# Patient Record
Sex: FEMALE | Race: WHITE | Employment: OTHER | URBAN - METROPOLITAN AREA
[De-identification: names, ages, dates, MRNs, and addresses within clinical notes are randomized per-mention and may not be internally consistent; named-entity substitution may affect disease eponyms.]

---

## 2018-09-12 ENCOUNTER — OFFICE VISIT (OUTPATIENT)
Dept: INFECTIOUS DISEASES | Age: 68
End: 2018-09-12
Payer: COMMERCIAL

## 2018-09-12 VITALS
WEIGHT: 175 LBS | BODY MASS INDEX: 31.01 KG/M2 | SYSTOLIC BLOOD PRESSURE: 129 MMHG | HEART RATE: 100 BPM | TEMPERATURE: 100.4 F | HEIGHT: 63 IN | DIASTOLIC BLOOD PRESSURE: 76 MMHG

## 2018-09-12 DIAGNOSIS — M46.24 CHRONIC OSTEOMYELITIS OF THORACIC SPINE (HCC): Primary | ICD-10-CM

## 2018-09-12 PROCEDURE — 99214 OFFICE O/P EST MOD 30 MIN: CPT | Performed by: INTERNAL MEDICINE

## 2018-09-12 RX ORDER — DOXYCYCLINE HYCLATE 100 MG/1
100 CAPSULE ORAL 2 TIMES DAILY
Qty: 60 CAPSULE | Refills: 5 | Status: SHIPPED | OUTPATIENT
Start: 2018-09-12 | End: 2019-05-10 | Stop reason: SDUPTHER

## 2018-09-12 RX ORDER — IBUPROFEN 200 MG
200 TABLET ORAL EVERY 6 HOURS PRN
COMMUNITY

## 2018-09-12 RX ORDER — DOXYCYCLINE HYCLATE 100 MG/1
100 CAPSULE ORAL 2 TIMES DAILY
COMMUNITY
End: 2018-09-12 | Stop reason: SDUPTHER

## 2018-09-12 ASSESSMENT — ENCOUNTER SYMPTOMS
GASTROINTESTINAL NEGATIVE: 1
RESPIRATORY NEGATIVE: 1

## 2018-09-12 NOTE — PROGRESS NOTES
inflammatory markers after x-rays were negative. The patient discontinued anti-inflammatories/pain medications and the pain did improve with time. The patient today does not report any concerns, no fevers or chills, no cough or shortness of breath, she does report that she has gained some weight as she is not as active as she was previously. 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 I have personally reviewed the past medical history, medications, social history, and I have updated the database accordingly. Past Medical History:     Past Medical History:   Diagnosis Date    Bronchitis     Chronic Osteomyelitis of thoracic spine     Diabetes mellitus (HCC)     type 2    Diskitis     Eczema     Epidural abscess     GERD (gastroesophageal reflux disease)     Hypertension     Postmenopausal     Thoracic spine osteomyelitis     Wheezing      Medications:     Current Outpatient Prescriptions   Medication Sig Dispense Refill    Multiple Vitamins-Minerals (MULTIVITAMIN ADULT PO) Take by mouth daily      ibuprofen (ADVIL) 200 MG tablet Take 200 mg by mouth every 6 hours as needed for Pain      doxycycline hyclate (VIBRAMYCIN) 100 MG capsule Take 1 capsule by mouth 2 times daily 60 capsule 5     No current facility-administered medications for this visit. Allergies:   Latex and Cephalosporins     Review of Systems:   Review of Systems   Constitutional: Positive for unexpected weight change (weight gain - less exercise). Negative for chills and fever. Respiratory: Negative. Cardiovascular: Negative. Gastrointestinal: Negative. Genitourinary: Negative. Musculoskeletal: Negative. Neurological: Negative.          Physical Examination :   /76 (Site: Left Upper Arm)   Pulse 100   Temp 100.4 °F (38 °C)   Ht 5' 3\" (1.6 m)   Wt 175 lb (79.4 kg)   BMI 31.00 kg/m²     Physical Exam lymphocytes 34.6 % [facility]   % monocytes 7.4 % [facility]   % eosinophils 5.9 % [facility]   % basophils 0.9 % [facility]   Neutrophils Absolute 5.1 1.5 - 6.6 [address]   Lymphocytes Absolute 3.4 1.0 - 3.5 X10E9/L [facility]   Monocytes Absolute 0.7 0 - 0.9 X10E9/L [facility]   Eosinophils Absolute 0.6 (H) 0.0 - 0.4 [id number] [address] [facility]   Basophils Absolute 0.1 0 - 0.9 [id number] [address] [facility]     C-reactive protein (07/05/2018 2:30 PM EDT)  C-reactive protein (07/05/2018 2:30 PM EDT)   Component Value Ref Range Performed At   CRP 0.2 0.000 - 0.744 mg/dL [facility]       Basic metabolic panel (61/90/8439 2:30 PM EDT)  Basic metabolic panel (78/49/2289 2:30 PM EDT)   Component Value Ref Range Performed At   Sodium 143 134 - 146 mmol/L [address]   Potassium, Bld 3.8 3.5 - 5.0 mmol/L [facility]   Chloride 107 98 - 109 mmol/L [facility]   CO2 26 22 - 32 mmol/L [facility]   Anion gap 10  Comment:   ANION GAP CALCULATION DOES NOT  INCLUDE K, NORMAL RANGES  REFLECT THIS CHANGE   4 - 12 mmol/L [address]   BUN, Bld 25 5 - 27 mg/dL [facility]   Creatinine 1.15 (H)Comment: METHOD TRACEABLE TO IDMS STANDARD 0.40 - 1.00 mg/dL [facility]   Glucose 100 (H) 65 - 99 mg/dL [facility]   Calcium 10.1 8.5 - 10.5 mg/dL [facility]   GFR MDRD Non Af Amer 47 (L) >59 ml/min/1.73sq.m [address]   GFR MDRD Af Amer 57 (L) >59 ml/min/1.73sq.m [facility]       Imaging Studies:   X-ray spine thoracic 3 views (07/05/2018 2:59

## 2019-05-10 RX ORDER — DOXYCYCLINE HYCLATE 100 MG/1
CAPSULE ORAL
Qty: 60 CAPSULE | Refills: 5 | Status: SHIPPED | OUTPATIENT
Start: 2019-05-10 | End: 2019-09-12 | Stop reason: SDUPTHER

## 2019-09-06 PROBLEM — L53.8 MACULAR ERYTHEMATOUS RASH: Status: ACTIVE | Noted: 2018-09-05

## 2019-09-06 PROBLEM — L08.9 SKIN INFECTION: Status: ACTIVE | Noted: 2018-09-05

## 2019-09-06 PROBLEM — M86.9 PYOGENIC INFLAMMATION OF BONE (HCC): Status: ACTIVE | Noted: 2018-07-06

## 2019-09-06 PROBLEM — I10 HYPERTENSION: Status: ACTIVE | Noted: 2017-01-09

## 2019-09-12 ENCOUNTER — OFFICE VISIT (OUTPATIENT)
Dept: INFECTIOUS DISEASES | Age: 69
End: 2019-09-12
Payer: COMMERCIAL

## 2019-09-12 VITALS
RESPIRATION RATE: 16 BRPM | DIASTOLIC BLOOD PRESSURE: 87 MMHG | WEIGHT: 177.8 LBS | BODY MASS INDEX: 31.5 KG/M2 | HEART RATE: 97 BPM | OXYGEN SATURATION: 97 % | TEMPERATURE: 97.3 F | SYSTOLIC BLOOD PRESSURE: 129 MMHG | HEIGHT: 63 IN

## 2019-09-12 DIAGNOSIS — M46.24 CHRONIC OSTEOMYELITIS OF THORACIC SPINE (HCC): Primary | ICD-10-CM

## 2019-09-12 PROCEDURE — 99213 OFFICE O/P EST LOW 20 MIN: CPT | Performed by: INTERNAL MEDICINE

## 2019-09-12 RX ORDER — DOXYCYCLINE HYCLATE 100 MG/1
CAPSULE ORAL
Qty: 60 CAPSULE | Refills: 11 | Status: SHIPPED | OUTPATIENT
Start: 2019-09-12 | End: 2020-09-15 | Stop reason: SDUPTHER

## 2019-09-12 ASSESSMENT — ENCOUNTER SYMPTOMS
RHINORRHEA: 1
RESPIRATORY NEGATIVE: 1
GASTROINTESTINAL NEGATIVE: 1

## 2019-09-27 DIAGNOSIS — M46.24 CHRONIC OSTEOMYELITIS OF THORACIC SPINE (HCC): ICD-10-CM

## 2020-09-15 ENCOUNTER — OFFICE VISIT (OUTPATIENT)
Dept: INFECTIOUS DISEASES | Age: 70
End: 2020-09-15
Payer: COMMERCIAL

## 2020-09-15 VITALS
SYSTOLIC BLOOD PRESSURE: 137 MMHG | TEMPERATURE: 97.4 F | OXYGEN SATURATION: 99 % | DIASTOLIC BLOOD PRESSURE: 84 MMHG | HEART RATE: 77 BPM | HEIGHT: 63 IN | WEIGHT: 196 LBS | BODY MASS INDEX: 34.73 KG/M2

## 2020-09-15 PROCEDURE — 99213 OFFICE O/P EST LOW 20 MIN: CPT | Performed by: INTERNAL MEDICINE

## 2020-09-15 RX ORDER — ALBUTEROL SULFATE 90 UG/1
2 AEROSOL, METERED RESPIRATORY (INHALATION) EVERY 6 HOURS PRN
COMMUNITY

## 2020-09-15 RX ORDER — CETIRIZINE HYDROCHLORIDE 10 MG/1
1 TABLET ORAL 2 TIMES DAILY
COMMUNITY
Start: 2020-09-05

## 2020-09-15 RX ORDER — TRIAMCINOLONE ACETONIDE 5 MG/G
CREAM TOPICAL 2 TIMES DAILY
COMMUNITY
Start: 2019-08-06

## 2020-09-15 RX ORDER — DOXYCYCLINE HYCLATE 100 MG/1
CAPSULE ORAL
Qty: 60 CAPSULE | Refills: 11 | Status: SHIPPED | OUTPATIENT
Start: 2020-09-15 | End: 2021-09-13

## 2020-09-15 ASSESSMENT — ENCOUNTER SYMPTOMS
RESPIRATORY NEGATIVE: 1
GASTROINTESTINAL NEGATIVE: 1
BACK PAIN: 1

## 2020-09-15 NOTE — PROGRESS NOTES
[facility]  Office Progress Note  Today's Date and Time: 9/15/2020, 2:30 PM    Impression:     1. Chronic osteomyelitis of thoracic spine (HCC)    2. Eczema, unspecified type    3. Rash and nonspecific skin eruption         Recommendations   · She will continue on oral doxycycline for the chronic osteomyelitis secondary to MSSA. · The patient's eczema is not well controlled and may need to consider stronger immunosuppressive therapy. · She does have a drug reaction on the anterior abdominal wall that is not at all the same as the diffuse/generalized eczema rash that she has. · The patient is to address this with the immunologist and may need to be considered for skin biopsies if the rash continues. · She will otherwise continue to follow-up with me on a yearly basis    I have ordered the following medications/ labs:  No orders of the defined types were placed in this encounter. Orders Placed This Encounter   Medications    doxycycline hyclate (VIBRAMYCIN) 100 MG capsule     Sig: TAKE 1 CAPSULE BY MOUTH TWICE DAILY     Dispense:  60 capsule     Refill:  11       Chief complaint/reason for consultation:     Chief Complaint   Patient presents with    Follow-up     1 year f/u osteomyelitis        History of Present Illness:   Roderick Yancey is a 78y.o.-year-old female who I am seeing in follow-up for chronic discitis/osteomyelitis at T9-T10. 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 She has been on oral suppressive therapy with doxycycline since that time and has actually done very well. Giancarlo Summers comes in today and she tells me that she has mostly been at home since this pandemic started and she has gained a lot of weight during that time as she is less active. She continues on oral doxycycline therapy for the chronic suppression for the osteomyelitis.   She has had some issues with allergies/pruritus. She does have known eczema but has recently developed a rash over her abdomen. She does have some chronic back pain but overall it is well controlled. She was treated for cellulitis of the left lower extremity with Augmentin and then subsequently with Bactrim. She also reports having had a root canal and was treated with amoxicillin. She did slip and fall at home and ended up falling on her face and actually had's significant ecchymoses. She follows with Dr. Nafisa Escoto from immunology for the eczema and she does take a bleach bath monthly and she is also using Zyrtec and Advair for the eczema. Otherwise no new issues and she has not been hospitalized over the last year. I have personally reviewedthe past medical history, medications, social history, and I have updated the database accordingly.   Past Medical History:     Past Medical History:   Diagnosis Date    Bronchitis     Chronic Osteomyelitis of thoracic spine     Diabetes mellitus (HCC)     type 2    Diskitis     Eczema     Epidural abscess     GERD (gastroesophageal reflux disease)     Hypertension     Macular erythematous rash 9/5/2018    Postmenopausal     Pyogenic inflammation of bone (HCC) 7/6/2018    Skin infection 9/5/2018    Thoracic spine osteomyelitis     Wheezing      Medications:     Current Outpatient Medications   Medication Sig Dispense Refill    albuterol sulfate  (90 Base) MCG/ACT inhaler Inhale 2 puffs into the lungs every 6 hours as needed      cetirizine (ZYRTEC) 10 MG tablet Take 1 tablet by mouth 2 times daily      fluticasone-salmeterol (ADVAIR HFA) 230-21 MCG/ACT inhaler Inhale 2 puffs into the lungs 2 times daily      triamcinolone (ARISTOCORT) 0.5 % cream Apply topically 2 times daily      doxycycline hyclate (VIBRAMYCIN) 100 MG capsule TAKE 1 CAPSULE BY MOUTH TWICE DAILY 60 capsule 11    Multiple Vitamins-Minerals (MULTIVITAMIN ADULT PO) Take by mouth daily      ibuprofen (ADVIL) 200 MG tablet Take 200 mg by mouth every 6 hours as needed for Pain       No current facility-administered medications for this visit. Allergies:   Latex; Fish-derived products; and Cephalosporins     Review of Systems:   Review of Systems   Constitutional: Negative. HENT: Negative. Respiratory: Negative. Cardiovascular: Negative. Gastrointestinal: Negative. Genitourinary: Negative. Musculoskeletal: Positive for back pain (intermittently). Skin: Positive for rash and wound. Neurological: Negative. Psychiatric/Behavioral: Negative. Physical Examination :   /84 (Site: Left Upper Arm, Position: Sitting, Cuff Size: Medium Adult)   Pulse 77   Temp 97.4 °F (36.3 °C) (Temporal)   Ht 5' 3\" (1.6 m)   Wt 196 lb (88.9 kg)   SpO2 99%   BMI 34.72 kg/m²     Physical Exam  Constitutional:       Appearance: She is well-developed. HENT:      Head: Normocephalic and atraumatic. Mouth/Throat:      Pharynx: Oropharynx is clear. Eyes:      Pupils: Pupils are equal, round, and reactive to light. Neck:      Musculoskeletal: Normal range of motion and neck supple. Cardiovascular:      Rate and Rhythm: Regular rhythm. Heart sounds: Normal heart sounds. Pulmonary:      Effort: Pulmonary effort is normal.      Breath sounds: Normal breath sounds. Abdominal:      General: Bowel sounds are normal.      Palpations: Abdomen is soft. Skin:     Findings: Rash present. Comments: Eczema rash on arms and legs  Rash on abdominal wall   Neurological:      Mental Status: She is alert and oriented to person, place, and time.          Laboratory studies :  Medical Decision Making:   I have independently reviewed the following labs:  CBC with Differential:  Lab Results   Component Value Date    WBC 6.4 08/17/2016    HGB 13.3 08/17/2016    HCT 39.8 08/17/2016     08/17/2016    LYMPHOPCT 34 08/17/2016    MONOPCT 7 08/17/2016       BMP:  Lab Results   Component Value Date     08/17/2016    K 4.2 08/17/2016     08/17/2016    CO2 22 08/17/2016    BUN 22 08/17/2016    CREATININE 0.80 08/17/2016       Hepatic Function Panel: No results found for: PROT, LABALBU, BILIDIR, IBILI, BILITOT, ALKPHOS, ALT, AST    Lab Results   Component Value Date    CRP 0.4 08/17/2016     Lab Results   Component Value Date    SEDRATE 3 08/17/2016       Thank you for allowing us to participate in the care of this patient. Pleasecall with questions. Pablito Avalos MD  [employer] messaging: [phone]    This note is created with the assistance of a speech recognition program.  While intending to generate a document that actually reflects the content ofthe visit, the document can still have some errors including those of syntax and sound a like substitutions which may escape proof reading. It such instances, actual meaning can be extrapolated by contextual diversion.

## 2021-09-13 DIAGNOSIS — M46.24 CHRONIC OSTEOMYELITIS OF THORACIC SPINE (HCC): ICD-10-CM

## 2021-09-13 NOTE — TELEPHONE ENCOUNTER
Dr Greg Person, patient is current and is scheduled for follow up on 9/21/21. Per last dictation patient is on this medication. Please sign for refill if ok. Thank you.

## 2021-09-14 RX ORDER — DOXYCYCLINE HYCLATE 100 MG/1
CAPSULE ORAL
Qty: 60 CAPSULE | Refills: 0 | Status: SHIPPED | OUTPATIENT
Start: 2021-09-14 | End: 2021-09-21 | Stop reason: SDUPTHER

## 2021-09-21 ENCOUNTER — OFFICE VISIT (OUTPATIENT)
Dept: INFECTIOUS DISEASES | Age: 71
End: 2021-09-21
Payer: COMMERCIAL

## 2021-09-21 VITALS
OXYGEN SATURATION: 98 % | HEIGHT: 63 IN | BODY MASS INDEX: 32.07 KG/M2 | TEMPERATURE: 97.7 F | HEART RATE: 86 BPM | SYSTOLIC BLOOD PRESSURE: 147 MMHG | DIASTOLIC BLOOD PRESSURE: 89 MMHG | WEIGHT: 181 LBS

## 2021-09-21 DIAGNOSIS — L03.119 RECURRENT CELLULITIS OF LOWER LEG: ICD-10-CM

## 2021-09-21 DIAGNOSIS — M46.24 CHRONIC OSTEOMYELITIS OF THORACIC SPINE (HCC): Primary | ICD-10-CM

## 2021-09-21 DIAGNOSIS — L30.8 OTHER ECZEMA: ICD-10-CM

## 2021-09-21 PROCEDURE — 99213 OFFICE O/P EST LOW 20 MIN: CPT | Performed by: INTERNAL MEDICINE

## 2021-09-21 RX ORDER — EPINEPHRINE 0.3 MG/.3ML
INJECTION SUBCUTANEOUS
COMMUNITY
Start: 2021-09-09

## 2021-09-21 RX ORDER — DOXYCYCLINE HYCLATE 100 MG/1
CAPSULE ORAL
Qty: 60 CAPSULE | Refills: 11 | Status: SHIPPED | OUTPATIENT
Start: 2021-09-21 | End: 2022-10-04 | Stop reason: SDUPTHER

## 2021-09-21 ASSESSMENT — ENCOUNTER SYMPTOMS
RESPIRATORY NEGATIVE: 1
GASTROINTESTINAL NEGATIVE: 1

## 2021-09-21 NOTE — PROGRESS NOTES
[facility]  Office Progress Note  Today's Date and Time: 9/21/2021, 2:36 PM    Impression:     1. Chronic osteomyelitis of thoracic spine (HCC)    2. Other eczema    3. Recurrent cellulitis of lower leg         Recommendations   · The patient continues to do well on chronic suppressive therapy with doxycycline. · The patient currently has a history of eczema which is not well controlled and has led to her having recurrent bouts of cellulitis. · She has not had any recent lab testing and therefore I will order CBC BMP and CRP. · She will continue on oral doxycycline therapy and follow-up with me in 1 year    I have ordered the following medications/ labs:  Orders Placed This Encounter   Procedures    CBC     Standing Status:   Future     Standing Expiration Date:   11/30/2021    C-Reactive Protein     Standing Status:   Future     Standing Expiration Date:   11/30/2021    Basic Metabolic Panel     Standing Status:   Future     Standing Expiration Date:   11/30/2021      Orders Placed This Encounter   Medications    doxycycline hyclate (VIBRAMYCIN) 100 MG capsule     Sig: TAKE 1 CAPSULE BY MOUTH TWICE DAILY     Dispense:  60 capsule     Refill:  6       Chief complaint/reason for consultation:     Chief Complaint   Patient presents with    Osteomyelitis      follow up        History of Present Illness:   Verito Oconnor is a 73y.o.-year-old female who history 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 She has been on oral suppressive therapy with doxycycline since that time and has actually done very well. Keena Ryder is here for her yearly follow-up and she reports that the eczema continues to be an issue and she continues to scratch secondary to dry skin/pruritus. .  She has had 2 bouts of cellulitis of the left lower extremity and she was not sure there was some environmental allergies she subsequently moved and decluttered her belongings and she does feel that overall things are little better. The patient is staying in a facility where she has more social support and she also changed her diet has lost about 15 pounds. She is due to have patch testing to see if she has any specific allergens that she could avoid that would help with the eczema in any way. She continues to have chronic back pain but it has been well controlled and tolerated and she remains pretty active. She is tolerating the oral doxycycline without any untoward effects. I have personally reviewedthe past medical history, medications, social history, and I have updated the database accordingly.   Past Medical History:     Past Medical History:   Diagnosis Date    Bronchitis     Chronic Osteomyelitis of thoracic spine     Diabetes mellitus (HCC)     type 2    Diskitis     Eczema     Epidural abscess     GERD (gastroesophageal reflux disease)     Hypertension     Macular erythematous rash 9/5/2018    Postmenopausal     Pyogenic inflammation of bone (HCC) 7/6/2018    Skin infection 9/5/2018    Thoracic spine osteomyelitis     Wheezing      Medications:     Current Outpatient Medications   Medication Sig Dispense Refill    EPINEPHrine (EPIPEN) 0.3 MG/0.3ML SOAJ injection INJECT 1 PEN IN THE MUSCLE ONE TIME AS DIRECTED FOR ALLERGIC REACTION CALL 911 AND GO TO ER      doxycycline hyclate (VIBRAMYCIN) 100 MG capsule TAKE 1 CAPSULE BY MOUTH TWICE DAILY 60 capsule 11    albuterol sulfate  (90 Base) MCG/ACT inhaler Inhale 2 puffs into the lungs every 6 hours as needed      cetirizine (ZYRTEC) 10 MG tablet Take 1 tablet by mouth 2 times daily      fluticasone-salmeterol (ADVAIR HFA) 230-21 MCG/ACT inhaler Inhale 2 puffs into the lungs 2 times daily      triamcinolone (ARISTOCORT) 0.5 % cream Apply topically 2 times daily      Multiple Vitamins-Minerals (MULTIVITAMIN ADULT PO) Take by mouth daily      ibuprofen (ADVIL) 200 MG tablet Take 200 mg by mouth every 6 hours as needed for Pain       No current facility-administered medications for this visit. Allergies:   Latex, Fish-derived products, and Cephalosporins     Review of Systems:   Review of Systems   Constitutional: Negative. Respiratory: Negative. Cardiovascular: Negative. Gastrointestinal: Negative. Genitourinary: Negative. Musculoskeletal: Negative. Skin: Positive for rash. Neurological: Negative. Psychiatric/Behavioral: Negative. Physical Examination :   BP (!) 147/89 (Site: Left Upper Arm, Position: Sitting)   Pulse 86   Temp 97.7 °F (36.5 °C) (Temporal)   Ht 5' 3\" (1.6 m)   Wt 181 lb (82.1 kg)   SpO2 98%   BMI 32.06 kg/m²     Physical Exam  Constitutional:       Appearance: She is well-developed. HENT:      Head: Normocephalic and atraumatic. Cardiovascular:      Rate and Rhythm: Regular rhythm. Heart sounds: Normal heart sounds. Pulmonary:      Effort: Pulmonary effort is normal.      Breath sounds: Normal breath sounds. Abdominal:      General: Bowel sounds are normal.      Palpations: Abdomen is soft. Musculoskeletal:      Cervical back: Normal range of motion and neck supple. Skin:     General: Skin is warm and dry. Findings: Rash (eczematous rash in the neck, and legs - popliteal fossa) present. Neurological:      Mental Status: She is alert and oriented to person, place, and time.          Laboratory studies :  Medical Decision Making:   I have independently reviewed the following labs:  CBC with Differential:  Lab Results   Component Value Date    WBC 6.4 08/17/2016    HGB 13.3 08/17/2016    HCT 39.8 08/17/2016     08/17/2016    LYMPHOPCT 34 08/17/2016    MONOPCT 7 08/17/2016       BMP:  Lab Results   Component Value Date     08/17/2016    K 4.2 08/17/2016     08/17/2016    CO2 22 08/17/2016    BUN 22 08/17/2016    CREATININE 0.80 08/17/2016       Hepatic Function Panel: No results found for: PROT, LABALBU, BILIDIR, IBILI, BILITOT, ALKPHOS, ALT, AST    Lab Results   Component Value Date    CRP 0.4 08/17/2016     Lab Results   Component Value Date    SEDRATE 3 08/17/2016       Thank you for allowing us to participate in the care of this patient. Pleasecall with questions. Khnag Thompson MD  [employer] messaging: [phone]    This note is created with the assistance of a speech recognition program.  While intending to generate a document that actually reflects the content ofthe visit, the document can still have some errors including those of syntax and sound a like substitutions which may escape proof reading. It such instances, actual meaning can be extrapolated by contextual diversion.

## 2022-10-04 ENCOUNTER — OFFICE VISIT (OUTPATIENT)
Dept: INFECTIOUS DISEASES | Age: 72
End: 2022-10-04
Payer: COMMERCIAL

## 2022-10-04 VITALS
BODY MASS INDEX: 30.83 KG/M2 | WEIGHT: 174 LBS | HEART RATE: 78 BPM | SYSTOLIC BLOOD PRESSURE: 143 MMHG | HEIGHT: 63 IN | DIASTOLIC BLOOD PRESSURE: 84 MMHG

## 2022-10-04 DIAGNOSIS — M46.24 CHRONIC OSTEOMYELITIS OF THORACIC SPINE (HCC): Primary | ICD-10-CM

## 2022-10-04 DIAGNOSIS — L30.8 OTHER ECZEMA: ICD-10-CM

## 2022-10-04 PROCEDURE — 99214 OFFICE O/P EST MOD 30 MIN: CPT | Performed by: INTERNAL MEDICINE

## 2022-10-04 PROCEDURE — 1123F ACP DISCUSS/DSCN MKR DOCD: CPT | Performed by: INTERNAL MEDICINE

## 2022-10-04 RX ORDER — DOXYCYCLINE HYCLATE 100 MG/1
CAPSULE ORAL
Qty: 60 CAPSULE | Refills: 11 | Status: SHIPPED | OUTPATIENT
Start: 2022-10-04

## 2022-10-04 ASSESSMENT — ENCOUNTER SYMPTOMS
RESPIRATORY NEGATIVE: 1
GASTROINTESTINAL NEGATIVE: 1

## 2022-10-04 NOTE — PROGRESS NOTES
[facility]  Office Progress Note  Today's Date and Time: 10/5/2022, 10:27 AM    Impression:     1. Chronic osteomyelitis of thoracic spine (HCC)    2. Other eczema         Recommendations   The patient continues on oral suppressive therapy with doxycycline and is doing well tolerating it. Does seem to be some issues with photosensitivity and this also complicates the fact that she has eczema which is not currently well controlled  I have recommended that she takes the influenza vaccine, COVID-19 booster especially if she is considering getting on the immunosuppressive therapy with Dupixent  She will otherwise follow-up with me in 1 year    I have ordered the following medications/ labs:  No orders of the defined types were placed in this encounter. Orders Placed This Encounter   Medications    doxycycline hyclate (VIBRAMYCIN) 100 MG capsule     Sig: TAKE 1 CAPSULE BY MOUTH TWICE DAILY     Dispense:  60 capsule     Refill:  11         Chief complaint/reason for consultation:     Chief Complaint   Patient presents with    Osteomyelitis      Yearly check up     Rash     Exzema is worse         History of Present Illness:   Obed Garvin is a 81y.o.-year-old female who I am seeing in follow-up and is here for her yearly visit. She has a history of MSSA discitis/osteomyelitis with associated epidural abscess that initially failed IV antibiotic therapy and subsequently required spinal surgery with corpectomy and hardware placement. The patient has been on oral suppressive therapy since her surgeries and has done very well. She continues on oral doxycycline and does seem to have some issues at times with photosensitivity. The patient also has a known diagnosis of eczema which has been quite severe and she has episodes of pruritus and worsening eczema currently has some drying lesions in the palms of the hand.   She has been using a topical cream and there has been some recommendation for her to use some immunosuppressive agents but she has been reluctant to do this. She does report some back pain when she sits / stands for a long time but overall she has done very well. I have personally reviewedthe past medical history, medications, social history, and I have updated the database accordingly. Past Medical History:     Past Medical History:   Diagnosis Date    Bronchitis     Chronic Osteomyelitis of thoracic spine     Diabetes mellitus (HCC)     type 2    Diskitis     Eczema     Epidural abscess     GERD (gastroesophageal reflux disease)     Hypertension     Macular erythematous rash 9/5/2018    Postmenopausal     Pyogenic inflammation of bone (HCC) 7/6/2018    Skin infection 9/5/2018    Thoracic spine osteomyelitis     Wheezing      Medications:     Current Outpatient Medications   Medication Sig Dispense Refill    doxycycline hyclate (VIBRAMYCIN) 100 MG capsule TAKE 1 CAPSULE BY MOUTH TWICE DAILY 60 capsule 11    EPINEPHrine (EPIPEN) 0.3 MG/0.3ML SOAJ injection INJECT 1 PEN IN THE MUSCLE ONE TIME AS DIRECTED FOR ALLERGIC REACTION CALL 911 AND GO TO ER      albuterol sulfate  (90 Base) MCG/ACT inhaler Inhale 2 puffs into the lungs every 6 hours as needed      cetirizine (ZYRTEC) 10 MG tablet Take 1 tablet by mouth 2 times daily      fluticasone-salmeterol (ADVAIR HFA) 230-21 MCG/ACT inhaler Inhale 2 puffs into the lungs 2 times daily      triamcinolone (ARISTOCORT) 0.5 % cream Apply topically 2 times daily      Multiple Vitamins-Minerals (MULTIVITAMIN ADULT PO) Take by mouth daily      ibuprofen (ADVIL;MOTRIN) 200 MG tablet Take 200 mg by mouth every 6 hours as needed for Pain       No current facility-administered medications for this visit. Allergies:   Latex, Fish-derived products, and Cephalosporins     Review of Systems:   Review of Systems   Constitutional: Negative. Respiratory: Negative. Cardiovascular: Negative. Gastrointestinal: Negative.     Genitourinary: Negative. Musculoskeletal: Negative. Skin:  Positive for rash. Neurological: Negative. Psychiatric/Behavioral: Negative. Physical Examination :   BP (!) 143/84 (Site: Left Upper Arm, Position: Sitting, Cuff Size: Medium Adult)   Pulse 78   Ht 5' 3\" (1.6 m)   Wt 174 lb (78.9 kg)   BMI 30.82 kg/m²     Physical Exam  Constitutional:       Appearance: She is well-developed. HENT:      Head: Normocephalic and atraumatic. Cardiovascular:      Rate and Rhythm: Regular rhythm. Heart sounds: Normal heart sounds. Pulmonary:      Effort: Pulmonary effort is normal.      Breath sounds: Normal breath sounds. Abdominal:      General: Bowel sounds are normal.      Palpations: Abdomen is soft. Musculoskeletal:      Cervical back: Neck supple. Skin:     General: Skin is warm and dry. Findings: Rash present. Neurological:      Mental Status: She is alert and oriented to person, place, and time.        Laboratory studies :  Medical Decision Making:   I have independently reviewed the following labs:  Contains abnormal data CBC auto differential  Order: [id number]   Ref Range & Units 2/22/22 1055   White Blood Cells 4.0 - 11.0 X10E9/L 8.5    RBC count 3.80 - 5.20 X10E12/L 4.51    Hemoglobin 11.7 - 15.5 g/dL 13.2    Hematocrit 35 - 47 % 39.2    MCV 80 - 100 fL 87    MCH 27 - 34 pg 29.3    MCHC 32 - 36 g/dL 33.7    RDW 11.5 - 15.0 % 14.8    Platelets 820 - 443 V88E9/Y 249    MPV 7 - 12 fL 8.4    % neutrophils % 45.5    % lymphocytes % 33.9    % monocytes % 7.0    % eosinophils % 13.0    % Basophils % 0.6    Neutrophils Absolute (A) 1.5 - 6.6 X10E9/L 3.9    Lymphocytes Absolute 1.0 - 3.5 X10E9/L 2.9    Monocytes Absolute 0 - 0.9 X10E9/L 0.6    Eosinophils Absolute 0.0 - 0.4 X10E9/L 1.1 High     Basophils Absolute 0.0 - 0.2 X10E9/L 0.0    Resulting Agency  [facility]   Specimen Collected: 02/22/22 10:55 Last Resulted: 02/22/22 12:51     C-reactive protein  Order: [id number]   Ref Range & Units 2/22/22 1055   CRP 0.000 - 0.744 mg/dL 0.1    Resulting [facility]   Specimen Collected: 02/22/22 10:55 Last Resulted: 02/23/22 08:14   Received From: [address]  Result Received: 10/04/22 13:03     IgE  Order: [id number]   Ref Range & Units 2/22/22 1055   Ige 0 - 165 IU/mL >5000 High     Resulting [facility]   Specimen Collected: 02/22/22 10:55 Last Resulted: 02/23/22 10:34     Basic Metabolic Panel  Order: [id number]   Ref Range & Units 10/5/21 0754   Sodium 134 - 146 mmol/L 141    Potassium, Bld 3.5 - 5.0 mmol/L 4.0    Chloride 98 - 109 mmol/L 107    CO2 22 - 32 mmol/L 25    Anion gap 5 - 15 mmol/L 9    BUN 5 - 27 mg/dL 16    Creatinine 0.40 - 1.00 mg/dL 0.91    Comment: METHOD TRACEABLE TO IDMS STANDARD   Glucose 65 - 99 mg/dL 92    Calcium 8.5 - 10.5 mg/dL 9.1    GFR MDRD Non Af Amer >59 ml/min/1.73sq.m >60    GFR MDRD Af Amer >59 ml/min/1.73sq.m >60    Resulting [facility]   Specimen Collected: 10/05/21 07:54 Last Resulted: 10/05/21 11:18   C-reactive protein  Order: [id number]   Ref Range & Units 10/5/21 0754   CRP 0.000 - 0.744 mg/dL 0.2    Resulting [facility]   Specimen Collected: 10/05/21 07:54 Last Resulted: 10/05/21 11:18   Received From: [address]  Result Received: 10/06/21 09:19   CBC auto differential  Order: [id number]   Ref Range & Units 10/5/21 0754   White Blood Cells 4.0 - 11.0 X10E9/L 7.4    RBC count 3.80 - 5.20 X10E12/L 4.63    Hemoglobin 11.7 - 15.5 g/dL 13.6    Hematocrit 35 - 47 % 41.0    MCV 80 - 100 fL 89    MCH 27 - 34 pg 29.3    MCHC 32 - 36 g/dL 33.1    RDW 11.5 - 15.0 % 14.0    Platelets 161 - 185 D92J8/L 229    MPV 7 - 12 fL 8.8    % neutrophils % 43.3    % lymphocytes % 37.2    % monocytes % 6.7    % eosinophils % 12.3    % Basophils % 0.5    Neutrophils Absolute (A) 1.5 - 6.6 X10E9/L 3.2    Lymphocytes Absolute 1.0 - 3.5 X10E9/L 2.7 Monocytes Absolute 0 - 0.9 X10E9/L 0.5    Eosinophils Absolute 0.0 - 0.4 X10E9/L 0.9 High     Basophils Absolute 0.0 - 0.2 X10E9/L 0.0    Resulting [address] [facility]   Specimen Collected: 10/05/21 07:54 Last Resulted: 10/05/21 11:08   Received From: [address] [place] [address]  Result Received: 10/06/21 09:19         Thank you for allowing us to participate in the care of this patient. Pleasecall with questions. Rylee Grijalva MD  [employer] messaging: [phone]    This note is created with the assistance of a speech recognition program.  While intending to generate a document that actually reflects the content ofthe visit, the document can still have some errors including those of syntax and sound a like substitutions which may escape proof reading. It such instances, actual meaning can be extrapolated by contextual diversion.

## 2023-03-06 ENCOUNTER — HOSPITAL ENCOUNTER (OUTPATIENT)
Age: 73
Setting detail: SPECIMEN
Discharge: HOME | End: 2023-03-06

## 2023-03-06 LAB
ALBUMIN SERPL-MCNC: 3.4 G/DL (ref 3.5–5.2)
ALBUMIN/GLOBULIN RATIO: 1 (ref 1–2.5)
ALP SERPL-CCNC: 80 U/L (ref 35–104)
ALT SERPL-CCNC: 35 U/L (ref 5–33)
AST SERPL-CCNC: 18 U/L
BILIRUB DIRECT SERPL-MCNC: 0.1 MG/DL
BILIRUB INDIRECT SERPL-MCNC: 0.1 MG/DL (ref 0–1)
BILIRUB SERPL-MCNC: 0.2 MG/DL (ref 0.3–1.2)
CREAT SERPL-MCNC: 0.62 MG/DL (ref 0.5–0.9)
GFR SERPL CREATININE-BSD FRML MDRD: >60 ML/MIN/1.73M2
PLATELET # BLD AUTO: 478 K/UL (ref 138–453)
PROT SERPL-MCNC: 6.8 G/DL (ref 6.4–8.3)
VANCOMYCIN TROUGH: 18.3 UG/ML (ref 10–20)
WBC # BLD AUTO: 11.7 K/UL (ref 3.5–11.3)

## 2023-03-06 PROCEDURE — 85048 AUTOMATED LEUKOCYTE COUNT: CPT

## 2023-03-06 PROCEDURE — 80202 ASSAY OF VANCOMYCIN: CPT

## 2023-03-06 PROCEDURE — 82565 ASSAY OF CREATININE: CPT

## 2023-03-06 PROCEDURE — P9603 ONE-WAY ALLOW PRORATED MILES: HCPCS

## 2023-03-06 PROCEDURE — 36415 COLL VENOUS BLD VENIPUNCTURE: CPT

## 2023-03-06 PROCEDURE — 85049 AUTOMATED PLATELET COUNT: CPT

## 2023-03-06 PROCEDURE — 80076 HEPATIC FUNCTION PANEL: CPT

## 2023-03-08 ENCOUNTER — HOSPITAL ENCOUNTER (OUTPATIENT)
Age: 73
Setting detail: SPECIMEN
Discharge: HOME | End: 2023-03-08

## 2023-03-08 LAB
ABSOLUTE EOS #: 0.17 K/UL (ref 0–0.44)
ABSOLUTE IMMATURE GRANULOCYTE: 0.1 K/UL (ref 0–0.3)
ABSOLUTE LYMPH #: 2.47 K/UL (ref 1.1–3.7)
ABSOLUTE MONO #: 0.94 K/UL (ref 0.1–1.2)
ALBUMIN SERPL-MCNC: 3.2 G/DL (ref 3.5–5.2)
ALBUMIN/GLOBULIN RATIO: 0.9 (ref 1–2.5)
ALP SERPL-CCNC: 79 U/L (ref 35–104)
ALT SERPL-CCNC: 26 U/L (ref 5–33)
ANION GAP SERPL CALCULATED.3IONS-SCNC: 13 MMOL/L (ref 9–17)
AST SERPL-CCNC: 15 U/L
BASOPHILS # BLD: 0 % (ref 0–2)
BASOPHILS ABSOLUTE: 0.03 K/UL (ref 0–0.2)
BILIRUB DIRECT SERPL-MCNC: <0.1 MG/DL
BILIRUB SERPL-MCNC: 0.3 MG/DL (ref 0.3–1.2)
BUN SERPL-MCNC: 12 MG/DL (ref 8–23)
CALCIUM SERPL-MCNC: 9.6 MG/DL (ref 8.6–10.4)
CHLORIDE SERPL-SCNC: 100 MMOL/L (ref 98–107)
CO2 SERPL-SCNC: 22 MMOL/L (ref 20–31)
CREAT SERPL-MCNC: 0.65 MG/DL (ref 0.5–0.9)
EOSINOPHILS RELATIVE PERCENT: 2 % (ref 1–4)
GFR SERPL CREATININE-BSD FRML MDRD: >60 ML/MIN/1.73M2
GLUCOSE SERPL-MCNC: 93 MG/DL (ref 70–99)
HCT VFR BLD AUTO: 31.3 % (ref 36.3–47.1)
HGB BLD-MCNC: 9.9 G/DL (ref 11.9–15.1)
IMMATURE GRANULOCYTES: 1 %
LYMPHOCYTES # BLD: 26 % (ref 24–43)
MCH RBC QN AUTO: 29 PG (ref 25.2–33.5)
MCHC RBC AUTO-ENTMCNC: 31.6 G/DL (ref 28.4–34.8)
MCV RBC AUTO: 91.8 FL (ref 82.6–102.9)
MONOCYTES # BLD: 10 % (ref 3–12)
NRBC AUTOMATED: 0 PER 100 WBC
PDW BLD-RTO: 14.9 % (ref 11.8–14.4)
PLATELET # BLD AUTO: 441 K/UL (ref 138–453)
PMV BLD AUTO: 9.9 FL (ref 8.1–13.5)
POTASSIUM SERPL-SCNC: 4.3 MMOL/L (ref 3.7–5.3)
PROT SERPL-MCNC: 6.7 G/DL (ref 6.4–8.3)
RBC # BLD: 3.41 M/UL (ref 3.95–5.11)
RBC # BLD: ABNORMAL 10*6/UL
SEG NEUTROPHILS: 61 % (ref 36–65)
SEGMENTED NEUTROPHILS ABSOLUTE COUNT: 5.76 K/UL (ref 1.5–8.1)
SODIUM SERPL-SCNC: 135 MMOL/L (ref 135–144)
VANCOMYCIN TROUGH: 22.4 UG/ML (ref 10–20)
WBC # BLD AUTO: 9.5 K/UL (ref 3.5–11.3)

## 2023-03-08 PROCEDURE — 82248 BILIRUBIN DIRECT: CPT

## 2023-03-08 PROCEDURE — 80053 COMPREHEN METABOLIC PANEL: CPT

## 2023-03-08 PROCEDURE — 80202 ASSAY OF VANCOMYCIN: CPT

## 2023-03-08 PROCEDURE — 85025 COMPLETE CBC W/AUTO DIFF WBC: CPT

## 2023-03-08 PROCEDURE — 36415 COLL VENOUS BLD VENIPUNCTURE: CPT

## 2023-03-08 PROCEDURE — P9603 ONE-WAY ALLOW PRORATED MILES: HCPCS

## 2023-03-09 ENCOUNTER — HOSPITAL ENCOUNTER (OUTPATIENT)
Age: 73
Setting detail: SPECIMEN
Discharge: HOME | End: 2023-03-09

## 2023-03-09 LAB — VANCOMYCIN TROUGH: 15 UG/ML (ref 10–20)

## 2023-03-09 PROCEDURE — 36415 COLL VENOUS BLD VENIPUNCTURE: CPT

## 2023-03-09 PROCEDURE — P9603 ONE-WAY ALLOW PRORATED MILES: HCPCS

## 2023-03-09 PROCEDURE — 80202 ASSAY OF VANCOMYCIN: CPT

## 2023-03-10 ENCOUNTER — HOSPITAL ENCOUNTER (OUTPATIENT)
Age: 73
Setting detail: SPECIMEN
Discharge: HOME | End: 2023-03-10

## 2023-03-10 LAB
ALBUMIN SERPL-MCNC: 3.2 G/DL (ref 3.5–5.2)
ALBUMIN/GLOBULIN RATIO: 1 (ref 1–2.5)
ALP SERPL-CCNC: 77 U/L (ref 35–104)
ALT SERPL-CCNC: 21 U/L (ref 5–33)
AST SERPL-CCNC: 11 U/L
BILIRUB DIRECT SERPL-MCNC: 0.1 MG/DL
BILIRUB INDIRECT SERPL-MCNC: 0.2 MG/DL (ref 0–1)
BILIRUB SERPL-MCNC: 0.3 MG/DL (ref 0.3–1.2)
CREAT SERPL-MCNC: 0.69 MG/DL (ref 0.5–0.9)
GFR SERPL CREATININE-BSD FRML MDRD: >60 ML/MIN/1.73M2
PLATELET # BLD AUTO: 357 K/UL (ref 138–453)
PROT SERPL-MCNC: 6.3 G/DL (ref 6.4–8.3)
VANCOMYCIN TROUGH: 17.1 UG/ML (ref 10–20)
WBC # BLD AUTO: 5.9 K/UL (ref 3.5–11.3)

## 2023-03-10 PROCEDURE — 80076 HEPATIC FUNCTION PANEL: CPT

## 2023-03-10 PROCEDURE — P9603 ONE-WAY ALLOW PRORATED MILES: HCPCS

## 2023-03-10 PROCEDURE — 82565 ASSAY OF CREATININE: CPT

## 2023-03-10 PROCEDURE — 85049 AUTOMATED PLATELET COUNT: CPT

## 2023-03-10 PROCEDURE — 85048 AUTOMATED LEUKOCYTE COUNT: CPT

## 2023-03-10 PROCEDURE — 36415 COLL VENOUS BLD VENIPUNCTURE: CPT

## 2023-03-10 PROCEDURE — 80202 ASSAY OF VANCOMYCIN: CPT

## 2023-03-13 ENCOUNTER — HOSPITAL ENCOUNTER (OUTPATIENT)
Age: 73
Setting detail: SPECIMEN
Discharge: HOME | End: 2023-03-13

## 2023-03-13 LAB
ALBUMIN SERPL-MCNC: 3.4 G/DL (ref 3.5–5.2)
ALBUMIN/GLOBULIN RATIO: 1.1 (ref 1–2.5)
ALP SERPL-CCNC: 76 U/L (ref 35–104)
ALT SERPL-CCNC: 16 U/L (ref 5–33)
AST SERPL-CCNC: 15 U/L
BILIRUB DIRECT SERPL-MCNC: <0.1 MG/DL
BILIRUB INDIRECT SERPL-MCNC: ABNORMAL MG/DL (ref 0–1)
BILIRUB SERPL-MCNC: 0.2 MG/DL (ref 0.3–1.2)
CREAT SERPL-MCNC: 0.7 MG/DL (ref 0.5–0.9)
GFR SERPL CREATININE-BSD FRML MDRD: >60 ML/MIN/1.73M2
PLATELET # BLD AUTO: 299 K/UL (ref 138–453)
PROT SERPL-MCNC: 6.6 G/DL (ref 6.4–8.3)
VANCOMYCIN TROUGH: 16.9 UG/ML (ref 10–20)
WBC # BLD AUTO: 4 K/UL (ref 3.5–11.3)

## 2023-03-13 PROCEDURE — 80076 HEPATIC FUNCTION PANEL: CPT

## 2023-03-13 PROCEDURE — 85048 AUTOMATED LEUKOCYTE COUNT: CPT

## 2023-03-13 PROCEDURE — 85049 AUTOMATED PLATELET COUNT: CPT

## 2023-03-13 PROCEDURE — 82565 ASSAY OF CREATININE: CPT

## 2023-03-13 PROCEDURE — P9603 ONE-WAY ALLOW PRORATED MILES: HCPCS

## 2023-03-13 PROCEDURE — 80202 ASSAY OF VANCOMYCIN: CPT

## 2023-03-13 PROCEDURE — 36415 COLL VENOUS BLD VENIPUNCTURE: CPT

## 2023-03-15 ENCOUNTER — HOSPITAL ENCOUNTER (OUTPATIENT)
Age: 73
Setting detail: SPECIMEN
Discharge: HOME | End: 2023-03-15

## 2023-03-15 LAB
ALBUMIN SERPL-MCNC: 3.3 G/DL (ref 3.5–5.2)
ALBUMIN/GLOBULIN RATIO: 1 (ref 1–2.5)
ALP SERPL-CCNC: 76 U/L (ref 35–104)
ALT SERPL-CCNC: 14 U/L (ref 5–33)
AST SERPL-CCNC: 15 U/L
BILIRUB DIRECT SERPL-MCNC: <0.1 MG/DL
BILIRUB INDIRECT SERPL-MCNC: ABNORMAL MG/DL (ref 0–1)
BILIRUB SERPL-MCNC: <0.1 MG/DL (ref 0.3–1.2)
CREAT SERPL-MCNC: 0.68 MG/DL (ref 0.5–0.9)
GFR SERPL CREATININE-BSD FRML MDRD: >60 ML/MIN/1.73M2
PLATELET # BLD AUTO: 277 K/UL (ref 138–453)
PROT SERPL-MCNC: 6.6 G/DL (ref 6.4–8.3)
VANCOMYCIN TROUGH: 14.5 UG/ML (ref 10–20)
WBC # BLD AUTO: 4.7 K/UL (ref 3.5–11.3)

## 2023-03-15 PROCEDURE — 82565 ASSAY OF CREATININE: CPT

## 2023-03-15 PROCEDURE — 80076 HEPATIC FUNCTION PANEL: CPT

## 2023-03-15 PROCEDURE — 85048 AUTOMATED LEUKOCYTE COUNT: CPT

## 2023-03-15 PROCEDURE — 36415 COLL VENOUS BLD VENIPUNCTURE: CPT

## 2023-03-15 PROCEDURE — 85049 AUTOMATED PLATELET COUNT: CPT

## 2023-03-15 PROCEDURE — P9603 ONE-WAY ALLOW PRORATED MILES: HCPCS

## 2023-03-15 PROCEDURE — 80202 ASSAY OF VANCOMYCIN: CPT

## 2023-03-17 ENCOUNTER — HOSPITAL ENCOUNTER (OUTPATIENT)
Age: 73
Setting detail: SPECIMEN
Discharge: HOME | End: 2023-03-17
Payer: COMMERCIAL

## 2023-03-17 LAB
ABSOLUTE EOS #: 0.18 K/UL (ref 0–0.4)
ABSOLUTE LYMPH #: 1.8 K/UL (ref 1–4.8)
ABSOLUTE MONO #: 0.35 K/UL (ref 0.1–1.3)
ALBUMIN SERPL-MCNC: 3.7 G/DL (ref 3.5–5.2)
ALP SERPL-CCNC: 92 U/L (ref 35–104)
ALT SERPL-CCNC: 17 U/L (ref 5–33)
AST SERPL-CCNC: 16 U/L
BASOPHILS # BLD: 0 % (ref 0–2)
BASOPHILS ABSOLUTE: 0 K/UL (ref 0–0.2)
BILIRUB DIRECT SERPL-MCNC: <0.1 MG/DL
BILIRUB INDIRECT SERPL-MCNC: ABNORMAL MG/DL (ref 0–1)
BILIRUB SERPL-MCNC: 0.2 MG/DL (ref 0.3–1.2)
CREAT SERPL-MCNC: 0.67 MG/DL (ref 0.5–0.9)
EOSINOPHILS RELATIVE PERCENT: 4 % (ref 0–4)
GFR SERPL CREATININE-BSD FRML MDRD: >60 ML/MIN/1.73M2
HCT VFR BLD AUTO: 31.3 % (ref 36–46)
HGB BLD-MCNC: 10.7 G/DL (ref 12–16)
LYMPHOCYTES # BLD: 41 % (ref 24–44)
MCH RBC QN AUTO: 29.5 PG (ref 26–34)
MCHC RBC AUTO-ENTMCNC: 34.4 G/DL (ref 31–37)
MCV RBC AUTO: 85.7 FL (ref 80–100)
MONOCYTES # BLD: 8 % (ref 1–7)
MORPHOLOGY: ABNORMAL
PDW BLD-RTO: 14.1 % (ref 11.5–14.9)
PLATELET # BLD AUTO: 291 K/UL (ref 150–450)
PMV BLD AUTO: 8.2 FL (ref 6–12)
PROT SERPL-MCNC: 6.9 G/DL (ref 6.4–8.3)
RBC # BLD: 3.65 M/UL (ref 4–5.2)
SEG NEUTROPHILS: 47 % (ref 36–66)
SEGMENTED NEUTROPHILS ABSOLUTE COUNT: 2.07 K/UL (ref 1.3–9.1)
VANCOMYCIN TROUGH DATE LAST DOSE: NORMAL
VANCOMYCIN TROUGH DOSE AMOUNT: NORMAL
VANCOMYCIN TROUGH TIME LAST DOSE: NORMAL
VANCOMYCIN TROUGH: 15.3 UG/ML (ref 10–20)
WBC # BLD AUTO: 4.4 K/UL (ref 3.5–11)

## 2023-03-17 PROCEDURE — 85025 COMPLETE CBC W/AUTO DIFF WBC: CPT

## 2023-03-17 PROCEDURE — 82565 ASSAY OF CREATININE: CPT

## 2023-03-17 PROCEDURE — 80202 ASSAY OF VANCOMYCIN: CPT

## 2023-03-17 PROCEDURE — 80076 HEPATIC FUNCTION PANEL: CPT
